# Patient Record
Sex: FEMALE | Race: OTHER | NOT HISPANIC OR LATINO | Employment: OTHER | URBAN - METROPOLITAN AREA
[De-identification: names, ages, dates, MRNs, and addresses within clinical notes are randomized per-mention and may not be internally consistent; named-entity substitution may affect disease eponyms.]

---

## 2022-05-09 ENCOUNTER — OFFICE VISIT (OUTPATIENT)
Dept: FAMILY MEDICINE | Facility: CLINIC | Age: 37
End: 2022-05-09

## 2022-05-09 VITALS
OXYGEN SATURATION: 98 % | DIASTOLIC BLOOD PRESSURE: 76 MMHG | HEIGHT: 63 IN | HEART RATE: 85 BPM | RESPIRATION RATE: 18 BRPM | SYSTOLIC BLOOD PRESSURE: 128 MMHG | WEIGHT: 266 LBS | BODY MASS INDEX: 47.13 KG/M2 | TEMPERATURE: 99 F

## 2022-05-09 DIAGNOSIS — R07.89 CHEST DISCOMFORT: Primary | ICD-10-CM

## 2022-05-09 DIAGNOSIS — R63.5 WEIGHT GAIN: ICD-10-CM

## 2022-05-09 DIAGNOSIS — K21.9 GASTROESOPHAGEAL REFLUX DISEASE, UNSPECIFIED WHETHER ESOPHAGITIS PRESENT: ICD-10-CM

## 2022-05-09 PROCEDURE — 93010 ELECTROCARDIOGRAM REPORT: CPT | Mod: S$PBB,,, | Performed by: NURSE PRACTITIONER

## 2022-05-09 PROCEDURE — 99214 OFFICE O/P EST MOD 30 MIN: CPT | Performed by: NURSE PRACTITIONER

## 2022-05-09 PROCEDURE — 93005 ELECTROCARDIOGRAM TRACING: CPT | Mod: PBBFAC | Performed by: NURSE PRACTITIONER

## 2022-05-09 PROCEDURE — 99203 PR OFFICE/OUTPT VISIT, NEW, LEVL III, 30-44 MIN: ICD-10-PCS | Mod: 25,S$PBB,, | Performed by: NURSE PRACTITIONER

## 2022-05-09 PROCEDURE — 93010 POCT EKG 12-LEAD: ICD-10-PCS | Mod: S$PBB,,, | Performed by: NURSE PRACTITIONER

## 2022-05-09 PROCEDURE — 99203 OFFICE O/P NEW LOW 30 MIN: CPT | Mod: 25,S$PBB,, | Performed by: NURSE PRACTITIONER

## 2022-05-09 RX ORDER — FAMOTIDINE 40 MG/1
40 TABLET, FILM COATED ORAL DAILY PRN
Qty: 90 TABLET | Refills: 1 | Status: SHIPPED | OUTPATIENT
Start: 2022-05-09 | End: 2023-05-09

## 2022-05-09 NOTE — PROGRESS NOTES
SUBJECTIVE:      Patient ID: Isabelle Munoz is a 36 y.o. female.    Chief Complaint: Establish Care    Pt presents as a new patient to establish care. She takes no medications on a daily basis and has no pertinent medical history. She presents today concerned because she has gained weight again and wants to ensure no comorbidities from obesity at this time. She used to exercise before COVID but now she hasn't been exercising regularly. Her diet isn't terrible reportedly. She eats three meals daily and does not snack between the meals. She is a . She is mainly drinking water during the day. She does report having a loaded tea intermittently. She drinks maybe one caffeinated product every now and again. Breakfast - typically, [employer] burrito; lunch - usually ordered, a salad of some kind, mainly grilled chicken, she dips the salad dressing; dinner - she reports she struggles the most with dinners. They are living half in her inlaws house and half in the camper while they are building a house. She reports she normally stays away from carbs. She will eat a normal dinner or a salad on the way home. She does report increased GERD recently as she gained weight. She does drink caffeine and enjoys spicy foods. Eye doctor and dentist are not UTD. Pap smear is managed by Dr. Campbell and she is not UTD. She does report one episode of intermittent chest tightness which was not recurrent and didn't radiate. Denies dizziness, palpitations, SOB, or peripheral edema. She does have a maternal hx of heart disease. Denies CP, SOB, wheezing, fevers, nausea, vomiting, diarrhea, constipation, numbness, weakness, dizziness, palpitations, or any other concerns at this time.    Chest Pain   This is a new problem. The current episode started more than 1 month ago. The problem occurs intermittently. The problem has been waxing and waning. The pain is present in the substernal region. The pain is at a severity of 0/10. The  patient is experiencing no pain. The pain does not radiate. Pertinent negatives include no abdominal pain, back pain, claudication, cough, diaphoresis, dizziness, exertional chest pressure, fever, headaches, hemoptysis, irregular heartbeat, leg pain, lower extremity edema, malaise/fatigue, nausea, near-syncope, numbness, orthopnea, palpitations, PND, shortness of breath, sputum production, syncope, vomiting or weakness. The pain is aggravated by nothing. She has tried nothing for the symptoms. Risk factors include lack of exercise, obesity and sedentary lifestyle.   Pertinent negatives for past medical history include no seizures.   Her family medical history is significant for CAD.       Past Surgical History:   Procedure Laterality Date     SECTION      x 2    Tummy Tuck  2007     Family History   Problem Relation Age of Onset    Heart disease Mother       Social History     Socioeconomic History    Marital status: Other   Tobacco Use    Smoking status: Never Smoker    Smokeless tobacco: Never Used   Substance and Sexual Activity    Alcohol use: Yes     Comment: rarely     Drug use: Never     Current Outpatient Medications   Medication Sig Dispense Refill    famotidine (PEPCID) 40 MG tablet Take 1 tablet (40 mg total) by mouth daily as needed for Heartburn. 90 tablet 1     No current facility-administered medications for this visit.     Review of patient's allergies indicates:   Allergen Reactions    Sulfa (sulfonamide antibiotics) Rash      History reviewed. No pertinent past medical history.  Past Surgical History:   Procedure Laterality Date     SECTION      x 2    Tummy Tuck  2007       Review of Systems   Constitutional: Negative for activity change, appetite change, chills, diaphoresis, fatigue, fever, malaise/fatigue and unexpected weight change.   HENT: Negative for congestion, ear pain, postnasal drip, rhinorrhea, sinus pressure, sinus pain, sneezing and sore throat.    Eyes:  "Negative for pain, discharge and visual disturbance.   Respiratory: Positive for chest tightness. Negative for cough, hemoptysis, sputum production, shortness of breath and wheezing.    Cardiovascular: Negative for chest pain, palpitations, orthopnea, claudication, leg swelling, syncope, PND and near-syncope.   Gastrointestinal: Negative for abdominal distention, abdominal pain, blood in stool, constipation, diarrhea, nausea and vomiting.   Genitourinary: Negative for difficulty urinating, flank pain, frequency, hematuria, pelvic pain, urgency and vaginal discharge.   Musculoskeletal: Negative for back pain, joint swelling and myalgias.   Skin: Negative for color change, rash and wound.   Neurological: Negative for dizziness, seizures, syncope, weakness, light-headedness, numbness and headaches.   Hematological: Negative for adenopathy.   Psychiatric/Behavioral: Negative for agitation, confusion, dysphoric mood, hallucinations, sleep disturbance and suicidal ideas. The patient is not nervous/anxious.       OBJECTIVE:      Vitals:    05/09/22 1330   BP: 128/76   BP Location: Left arm   Patient Position: Sitting   BP Method: X-Large (Manual)   Pulse: 85   Resp: 18   Temp: 98.7 °F (37.1 °C)   TempSrc: Oral   SpO2: 98%   Weight: 120.7 kg (266 lb)   Height: 5' 3" (1.6 m)     Physical Exam  Vitals reviewed.   Constitutional:       General: She is not in acute distress.     Appearance: Normal appearance. She is well-developed. She is morbidly obese. She is not diaphoretic.   HENT:      Head: Normocephalic and atraumatic.      Right Ear: Hearing, tympanic membrane, ear canal and external ear normal.      Left Ear: Hearing, tympanic membrane, ear canal and external ear normal.      Nose: Nose normal. No mucosal edema, congestion or rhinorrhea.      Mouth/Throat:      Lips: Pink.      Mouth: Mucous membranes are moist.      Pharynx: Oropharynx is clear. Uvula midline. No pharyngeal swelling, oropharyngeal exudate or posterior " oropharyngeal erythema.   Eyes:      General: Lids are normal. No scleral icterus.        Right eye: No discharge.         Left eye: No discharge.      Extraocular Movements: Extraocular movements intact.      Conjunctiva/sclera: Conjunctivae normal.      Pupils: Pupils are equal, round, and reactive to light.   Neck:      Thyroid: No thyroid mass or thyromegaly.      Vascular: No carotid bruit.      Trachea: Trachea and phonation normal. No tracheal deviation.   Cardiovascular:      Rate and Rhythm: Normal rate and regular rhythm.      Pulses: Normal pulses.      Heart sounds: Normal heart sounds. No murmur heard.    No friction rub. No gallop.   Pulmonary:      Effort: Pulmonary effort is normal. No respiratory distress.      Breath sounds: Normal breath sounds. No stridor. No decreased breath sounds, wheezing, rhonchi or rales.   Chest:   Breasts:      Right: No supraclavicular adenopathy.      Left: No supraclavicular adenopathy.       Abdominal:      General: Bowel sounds are normal. There is no distension or abdominal bruit.      Palpations: Abdomen is soft. There is no hepatomegaly, splenomegaly or mass.      Tenderness: There is no abdominal tenderness. There is no guarding or rebound. Negative signs include Garcia's sign.      Hernia: No hernia is present.   Musculoskeletal:         General: Normal range of motion.      Cervical back: Full passive range of motion without pain, normal range of motion and neck supple.      Right lower leg: No edema.      Left lower leg: No edema.   Lymphadenopathy:      Cervical: No cervical adenopathy.      Upper Body:      Right upper body: No supraclavicular adenopathy.      Left upper body: No supraclavicular adenopathy.   Skin:     General: Skin is warm and dry.      Capillary Refill: Capillary refill takes less than 2 seconds.      Findings: No rash.   Neurological:      General: No focal deficit present.      Mental Status: She is alert and oriented to person, place,  and time.      Coordination: Coordination is intact.      Gait: Gait is intact.   Psychiatric:         Attention and Perception: Attention and perception normal.         Mood and Affect: Mood and affect normal.         Speech: Speech normal.         Behavior: Behavior normal. Behavior is cooperative.         Thought Content: Thought content normal. Thought content does not include suicidal plan.         Cognition and Memory: Cognition and memory normal.         Judgment: Judgment normal.        Assessment:       1. Chest discomfort    2. Weight gain    3. Gastroesophageal reflux disease, unspecified whether esophagitis present        Plan:       Chest discomfort  Likely GERD which she does report instances of heartburn which has worsened while she gained weight. EKG today is NSR with no ST elevation, T wave changes, or arrhythmias present.  -     POCT EKG 12-LEAD (NOT FOR [facility] USE)    Weight gain  Will call with results.  -     CBC Auto Differential; Future; Expected date: 05/09/2022  -     Comprehensive Metabolic Panel; Future; Expected date: 05/09/2022  -     Lipid Panel; Future; Expected date: 05/09/2022  -     TSH; Future; Expected date: 05/09/2022  -     T4, free; Future; Expected date: 05/09/2022  -     Hemoglobin A1C; Future; Expected date: 05/09/2022    Gastroesophageal reflux disease, unspecified whether esophagitis present  Educated patient on lifestyle modifications to help control/reduce reflux/abdominal pain including: smoking cessation (if current smoker), weight loss (if overweight), small frequent meals, avoid large meals, avoid spicy, greasy, tomato-based foods. Elevate head of bed if nocturnal symptoms are present and avoid eating 2-3 hrs before bed. Limit NSAID use since they can cause GI upset, bleeding, and/or ulcers. If NSAIDs needed, take with food. Educated to avoid known foods which trigger reflux symptoms & to minimize/avoid high-fat foods (chocolate, caffeine, citrus, alcohol, & tomato  products). Start famotidine daily PRN.  -     famotidine (PEPCID) 40 MG tablet; Take 1 tablet (40 mg total) by mouth daily as needed for Heartburn.  Dispense: 90 tablet; Refill: 1        Follow up in about 1 year (around 5/9/2023) for Annual Well Check.      5/9/2022 RAYMOND Gimenez, FNP

## 2022-05-16 ENCOUNTER — LAB VISIT (OUTPATIENT)
Dept: LAB | Facility: HOSPITAL | Age: 37
End: 2022-05-16
Attending: NURSE PRACTITIONER

## 2022-05-16 DIAGNOSIS — R63.5 ABNORMAL WEIGHT GAIN: Primary | ICD-10-CM

## 2022-05-16 LAB
ALBUMIN SERPL BCP-MCNC: 4.5 G/DL (ref 3.5–5.2)
ALP SERPL-CCNC: 58 U/L (ref 55–135)
ALT SERPL W/O P-5'-P-CCNC: 50 U/L (ref 10–44)
ANION GAP SERPL CALC-SCNC: 11 MMOL/L (ref 8–16)
AST SERPL-CCNC: 23 U/L (ref 10–40)
BASOPHILS # BLD AUTO: 0.03 K/UL (ref 0–0.2)
BASOPHILS NFR BLD: 0.3 % (ref 0–1.9)
BILIRUB SERPL-MCNC: 1.1 MG/DL (ref 0.1–1)
BUN SERPL-MCNC: 10 MG/DL (ref 6–20)
CALCIUM SERPL-MCNC: 9.3 MG/DL (ref 8.7–10.5)
CHLORIDE SERPL-SCNC: 102 MMOL/L (ref 95–110)
CHOLEST SERPL-MCNC: 166 MG/DL (ref 120–199)
CHOLEST/HDLC SERPL: 3.1 {RATIO} (ref 2–5)
CO2 SERPL-SCNC: 22 MMOL/L (ref 23–29)
CREAT SERPL-MCNC: 0.5 MG/DL (ref 0.5–1.4)
DIFFERENTIAL METHOD: ABNORMAL
EOSINOPHIL # BLD AUTO: 0.1 K/UL (ref 0–0.5)
EOSINOPHIL NFR BLD: 1.5 % (ref 0–8)
ERYTHROCYTE [DISTWIDTH] IN BLOOD BY AUTOMATED COUNT: 12.4 % (ref 11.5–14.5)
EST. GFR  (AFRICAN AMERICAN): >60 ML/MIN/1.73 M^2
EST. GFR  (NON AFRICAN AMERICAN): >60 ML/MIN/1.73 M^2
ESTIMATED AVG GLUCOSE: 111 MG/DL (ref 68–131)
GLUCOSE SERPL-MCNC: 105 MG/DL (ref 70–110)
HBA1C MFR BLD: 5.5 % (ref 4.5–6.2)
HCT VFR BLD AUTO: 41.7 % (ref 37–48.5)
HDLC SERPL-MCNC: 54 MG/DL (ref 40–75)
HDLC SERPL: 32.5 % (ref 20–50)
HGB BLD-MCNC: 13.9 G/DL (ref 12–16)
IMM GRANULOCYTES # BLD AUTO: 0.03 K/UL (ref 0–0.04)
IMM GRANULOCYTES NFR BLD AUTO: 0.3 % (ref 0–0.5)
LDLC SERPL CALC-MCNC: 97.2 MG/DL (ref 63–159)
LYMPHOCYTES # BLD AUTO: 1.4 K/UL (ref 1–4.8)
LYMPHOCYTES NFR BLD: 14.7 % (ref 18–48)
MCH RBC QN AUTO: 29.6 PG (ref 27–31)
MCHC RBC AUTO-ENTMCNC: 33.3 G/DL (ref 32–36)
MCV RBC AUTO: 89 FL (ref 82–98)
MONOCYTES # BLD AUTO: 0.7 K/UL (ref 0.3–1)
MONOCYTES NFR BLD: 7.5 % (ref 4–15)
NEUTROPHILS # BLD AUTO: 7.1 K/UL (ref 1.8–7.7)
NEUTROPHILS NFR BLD: 75.7 % (ref 38–73)
NONHDLC SERPL-MCNC: 112 MG/DL
NRBC BLD-RTO: 0 /100 WBC
PLATELET # BLD AUTO: 326 K/UL (ref 150–450)
PMV BLD AUTO: 10.3 FL (ref 9.2–12.9)
POTASSIUM SERPL-SCNC: 3.6 MMOL/L (ref 3.5–5.1)
PROT SERPL-MCNC: 7.7 G/DL (ref 6–8.4)
RBC # BLD AUTO: 4.7 M/UL (ref 4–5.4)
SODIUM SERPL-SCNC: 135 MMOL/L (ref 136–145)
T4 FREE SERPL-MCNC: 0.86 NG/DL (ref 0.71–1.51)
TRIGL SERPL-MCNC: 74 MG/DL (ref 30–150)
TSH SERPL DL<=0.005 MIU/L-ACNC: 1.32 UIU/ML (ref 0.34–5.6)
WBC # BLD AUTO: 9.38 K/UL (ref 3.9–12.7)

## 2022-05-16 PROCEDURE — 85025 COMPLETE CBC W/AUTO DIFF WBC: CPT | Performed by: NURSE PRACTITIONER

## 2022-05-16 PROCEDURE — 84439 ASSAY OF FREE THYROXINE: CPT | Performed by: NURSE PRACTITIONER

## 2022-05-16 PROCEDURE — 83036 HEMOGLOBIN GLYCOSYLATED A1C: CPT | Performed by: NURSE PRACTITIONER

## 2022-05-16 PROCEDURE — 80061 LIPID PANEL: CPT | Performed by: NURSE PRACTITIONER

## 2022-05-16 PROCEDURE — 80053 COMPREHEN METABOLIC PANEL: CPT | Performed by: NURSE PRACTITIONER

## 2022-05-16 PROCEDURE — 36415 COLL VENOUS BLD VENIPUNCTURE: CPT | Performed by: NURSE PRACTITIONER

## 2022-05-16 PROCEDURE — 84443 ASSAY THYROID STIM HORMONE: CPT | Performed by: NURSE PRACTITIONER

## 2022-05-18 ENCOUNTER — PATIENT MESSAGE (OUTPATIENT)
Dept: FAMILY MEDICINE | Facility: CLINIC | Age: 37
End: 2022-05-18

## 2022-06-21 LAB
EKG 12-LEAD: NORMAL
PR INTERVAL: NORMAL
PRT AXES: NORMAL
QRS DURATION: NORMAL
QT/QTC: NORMAL
VENTRICULAR RATE: NORMAL

## 2022-10-08 ENCOUNTER — PATIENT MESSAGE (OUTPATIENT)
Dept: FAMILY MEDICINE | Facility: CLINIC | Age: 37
End: 2022-10-08

## 2022-10-10 ENCOUNTER — PATIENT MESSAGE (OUTPATIENT)
Dept: FAMILY MEDICINE | Facility: CLINIC | Age: 37
End: 2022-10-10

## 2023-06-15 ENCOUNTER — TELEPHONE (OUTPATIENT)
Dept: EMERGENCY MEDICINE | Facility: HOSPITAL | Age: 38
End: 2023-06-15

## 2023-06-15 RX ORDER — NEOMYCIN SULFATE, POLYMYXIN B SULFATE AND DEXAMETHASONE 3.5; 10000; 1 MG/ML; [USP'U]/ML; MG/ML
1 SUSPENSION/ DROPS OPHTHALMIC
Qty: 5 ML | Refills: 0 | Status: SHIPPED | OUTPATIENT
Start: 2023-06-15 | End: 2023-06-22

## 2024-01-19 RX ORDER — ONDANSETRON 4 MG/1
4 TABLET, ORALLY DISINTEGRATING ORAL EVERY 8 HOURS PRN
Qty: 15 TABLET | Refills: 0 | Status: SHIPPED | OUTPATIENT
Start: 2024-01-19 | End: 2024-01-24

## 2024-11-04 LAB
HUMAN PAPILLOMAVIRUS (HPV): NORMAL
PAP RECOMMENDATION EXT: NORMAL
PAP SMEAR: NORMAL

## 2025-01-07 ENCOUNTER — TELEPHONE (OUTPATIENT)
Dept: FAMILY MEDICINE | Facility: CLINIC | Age: 40
End: 2025-01-07
Payer: MEDICAID

## 2025-01-07 NOTE — TELEPHONE ENCOUNTER
----- Message from Tanner sent at 1/7/2025  3:32 PM CST -----  Contact: Sheela  Type:  Sooner Appointment Request    Caller is requesting a sooner appointment.  Caller declined first available appointment listed below.  Caller will not accept being placed on the waitlist and is requesting a message be sent to doctor.    Name of Caller:  Sheela - Mother    When is the first available appointment?  NA  Symptoms:  NA    Would the patient rather a call back or a response via [facility]? Call Back  Best Call Back Number:  [phone]    Additional Information:  Sheela is requesting a call back, she is wanting to schedule an appt for the patient, she also has some questions regarding the patients insurance ( she doesn't have the info with her)

## 2025-01-08 ENCOUNTER — OFFICE VISIT (OUTPATIENT)
Dept: FAMILY MEDICINE | Facility: CLINIC | Age: 40
End: 2025-01-08
Payer: MEDICAID

## 2025-01-08 ENCOUNTER — TELEPHONE (OUTPATIENT)
Dept: FAMILY MEDICINE | Facility: CLINIC | Age: 40
End: 2025-01-08
Payer: MEDICAID

## 2025-01-08 ENCOUNTER — PATIENT OUTREACH (OUTPATIENT)
Dept: ADMINISTRATIVE | Facility: HOSPITAL | Age: 40
End: 2025-01-08
Payer: MEDICAID

## 2025-01-08 ENCOUNTER — LAB VISIT (OUTPATIENT)
Dept: LAB | Facility: HOSPITAL | Age: 40
End: 2025-01-08
Attending: STUDENT IN AN ORGANIZED HEALTH CARE EDUCATION/TRAINING PROGRAM
Payer: MEDICAID

## 2025-01-08 VITALS
HEIGHT: 63 IN | WEIGHT: 293 LBS | BODY MASS INDEX: 51.91 KG/M2 | OXYGEN SATURATION: 99 % | DIASTOLIC BLOOD PRESSURE: 80 MMHG | SYSTOLIC BLOOD PRESSURE: 136 MMHG | HEART RATE: 101 BPM

## 2025-01-08 DIAGNOSIS — Z00.00 ROUTINE GENERAL MEDICAL EXAMINATION AT A HEALTH CARE FACILITY: Primary | ICD-10-CM

## 2025-01-08 DIAGNOSIS — J32.9 SINUSITIS, UNSPECIFIED CHRONICITY, UNSPECIFIED LOCATION: ICD-10-CM

## 2025-01-08 DIAGNOSIS — K21.9 GASTROESOPHAGEAL REFLUX DISEASE, UNSPECIFIED WHETHER ESOPHAGITIS PRESENT: ICD-10-CM

## 2025-01-08 DIAGNOSIS — Z83.49 FAMILY HISTORY OF HEMOCHROMATOSIS: ICD-10-CM

## 2025-01-08 DIAGNOSIS — Z00.00 ROUTINE GENERAL MEDICAL EXAMINATION AT A HEALTH CARE FACILITY: ICD-10-CM

## 2025-01-08 DIAGNOSIS — Z71.3 WEIGHT LOSS COUNSELING, ENCOUNTER FOR: ICD-10-CM

## 2025-01-08 LAB
ALBUMIN SERPL BCP-MCNC: 4.1 G/DL (ref 3.5–5.2)
ALP SERPL-CCNC: 70 U/L (ref 40–150)
ALT SERPL W/O P-5'-P-CCNC: 29 U/L (ref 10–44)
ANION GAP SERPL CALC-SCNC: 11 MMOL/L (ref 8–16)
AST SERPL-CCNC: 20 U/L (ref 10–40)
BASOPHILS # BLD AUTO: 0.03 K/UL (ref 0–0.2)
BASOPHILS NFR BLD: 0.5 % (ref 0–1.9)
BILIRUB SERPL-MCNC: 1 MG/DL (ref 0.1–1)
BUN SERPL-MCNC: 12 MG/DL (ref 6–20)
CALCIUM SERPL-MCNC: 9.5 MG/DL (ref 8.7–10.5)
CHLORIDE SERPL-SCNC: 105 MMOL/L (ref 95–110)
CHOLEST SERPL-MCNC: 144 MG/DL (ref 120–199)
CHOLEST/HDLC SERPL: 3.2 {RATIO} (ref 2–5)
CO2 SERPL-SCNC: 23 MMOL/L (ref 23–29)
CREAT SERPL-MCNC: 0.6 MG/DL (ref 0.5–1.4)
DIFFERENTIAL METHOD BLD: ABNORMAL
EOSINOPHIL # BLD AUTO: 0.1 K/UL (ref 0–0.5)
EOSINOPHIL NFR BLD: 2.3 % (ref 0–8)
ERYTHROCYTE [DISTWIDTH] IN BLOOD BY AUTOMATED COUNT: 13.2 % (ref 11.5–14.5)
EST. GFR  (NO RACE VARIABLE): >60 ML/MIN/1.73 M^2
ESTIMATED AVG GLUCOSE: 123 MG/DL (ref 68–131)
GLUCOSE SERPL-MCNC: 104 MG/DL (ref 70–110)
HBA1C MFR BLD: 5.9 % (ref 4–5.6)
HCT VFR BLD AUTO: 40.8 % (ref 37–48.5)
HDLC SERPL-MCNC: 45 MG/DL (ref 40–75)
HDLC SERPL: 31.3 % (ref 20–50)
HGB BLD-MCNC: 12.6 G/DL (ref 12–16)
IMM GRANULOCYTES # BLD AUTO: 0.01 K/UL (ref 0–0.04)
IMM GRANULOCYTES NFR BLD AUTO: 0.2 % (ref 0–0.5)
LDLC SERPL CALC-MCNC: 88.4 MG/DL (ref 63–159)
LYMPHOCYTES # BLD AUTO: 1.2 K/UL (ref 1–4.8)
LYMPHOCYTES NFR BLD: 19.5 % (ref 18–48)
MCH RBC QN AUTO: 27.9 PG (ref 27–31)
MCHC RBC AUTO-ENTMCNC: 30.9 G/DL (ref 32–36)
MCV RBC AUTO: 91 FL (ref 82–98)
MONOCYTES # BLD AUTO: 0.6 K/UL (ref 0.3–1)
MONOCYTES NFR BLD: 10.7 % (ref 4–15)
NEUTROPHILS # BLD AUTO: 4 K/UL (ref 1.8–7.7)
NEUTROPHILS NFR BLD: 66.8 % (ref 38–73)
NONHDLC SERPL-MCNC: 99 MG/DL
NRBC BLD-RTO: 0 /100 WBC
PLATELET # BLD AUTO: 322 K/UL (ref 150–450)
PMV BLD AUTO: 10.6 FL (ref 9.2–12.9)
POTASSIUM SERPL-SCNC: 3.9 MMOL/L (ref 3.5–5.1)
PROT SERPL-MCNC: 7.8 G/DL (ref 6–8.4)
RBC # BLD AUTO: 4.51 M/UL (ref 4–5.4)
SODIUM SERPL-SCNC: 139 MMOL/L (ref 136–145)
T4 FREE SERPL-MCNC: 0.9 NG/DL (ref 0.71–1.51)
TRIGL SERPL-MCNC: 53 MG/DL (ref 30–150)
TSH SERPL DL<=0.005 MIU/L-ACNC: 1.37 UIU/ML (ref 0.4–4)
WBC # BLD AUTO: 5.96 K/UL (ref 3.9–12.7)

## 2025-01-08 PROCEDURE — 85025 COMPLETE CBC W/AUTO DIFF WBC: CPT | Performed by: STUDENT IN AN ORGANIZED HEALTH CARE EDUCATION/TRAINING PROGRAM

## 2025-01-08 PROCEDURE — 84443 ASSAY THYROID STIM HORMONE: CPT | Performed by: STUDENT IN AN ORGANIZED HEALTH CARE EDUCATION/TRAINING PROGRAM

## 2025-01-08 PROCEDURE — 3075F SYST BP GE 130 - 139MM HG: CPT | Mod: CPTII,,, | Performed by: STUDENT IN AN ORGANIZED HEALTH CARE EDUCATION/TRAINING PROGRAM

## 2025-01-08 PROCEDURE — 1159F MED LIST DOCD IN RCRD: CPT | Mod: CPTII,,, | Performed by: STUDENT IN AN ORGANIZED HEALTH CARE EDUCATION/TRAINING PROGRAM

## 2025-01-08 PROCEDURE — 80053 COMPREHEN METABOLIC PANEL: CPT | Performed by: STUDENT IN AN ORGANIZED HEALTH CARE EDUCATION/TRAINING PROGRAM

## 2025-01-08 PROCEDURE — 84439 ASSAY OF FREE THYROXINE: CPT | Performed by: STUDENT IN AN ORGANIZED HEALTH CARE EDUCATION/TRAINING PROGRAM

## 2025-01-08 PROCEDURE — 81256 HFE GENE: CPT | Performed by: STUDENT IN AN ORGANIZED HEALTH CARE EDUCATION/TRAINING PROGRAM

## 2025-01-08 PROCEDURE — 1160F RVW MEDS BY RX/DR IN RCRD: CPT | Mod: CPTII,,, | Performed by: STUDENT IN AN ORGANIZED HEALTH CARE EDUCATION/TRAINING PROGRAM

## 2025-01-08 PROCEDURE — 83036 HEMOGLOBIN GLYCOSYLATED A1C: CPT | Performed by: STUDENT IN AN ORGANIZED HEALTH CARE EDUCATION/TRAINING PROGRAM

## 2025-01-08 PROCEDURE — 3079F DIAST BP 80-89 MM HG: CPT | Mod: CPTII,,, | Performed by: STUDENT IN AN ORGANIZED HEALTH CARE EDUCATION/TRAINING PROGRAM

## 2025-01-08 PROCEDURE — 3044F HG A1C LEVEL LT 7.0%: CPT | Mod: CPTII,,, | Performed by: STUDENT IN AN ORGANIZED HEALTH CARE EDUCATION/TRAINING PROGRAM

## 2025-01-08 PROCEDURE — 80061 LIPID PANEL: CPT | Performed by: STUDENT IN AN ORGANIZED HEALTH CARE EDUCATION/TRAINING PROGRAM

## 2025-01-08 PROCEDURE — 99395 PREV VISIT EST AGE 18-39: CPT | Mod: S$PBB,,, | Performed by: STUDENT IN AN ORGANIZED HEALTH CARE EDUCATION/TRAINING PROGRAM

## 2025-01-08 PROCEDURE — 3008F BODY MASS INDEX DOCD: CPT | Mod: CPTII,,, | Performed by: STUDENT IN AN ORGANIZED HEALTH CARE EDUCATION/TRAINING PROGRAM

## 2025-01-08 PROCEDURE — 99214 OFFICE O/P EST MOD 30 MIN: CPT | Mod: PBBFAC,PO | Performed by: STUDENT IN AN ORGANIZED HEALTH CARE EDUCATION/TRAINING PROGRAM

## 2025-01-08 PROCEDURE — 99999 PR PBB SHADOW E&M-EST. PATIENT-LVL IV: CPT | Mod: PBBFAC,,, | Performed by: STUDENT IN AN ORGANIZED HEALTH CARE EDUCATION/TRAINING PROGRAM

## 2025-01-08 RX ORDER — FLUTICASONE PROPIONATE 50 MCG
1 SPRAY, SUSPENSION (ML) NASAL DAILY
Qty: 11.1 ML | Refills: 0 | Status: SHIPPED | OUTPATIENT
Start: 2025-01-08

## 2025-01-08 RX ORDER — BENZONATATE 100 MG/1
100 CAPSULE ORAL 3 TIMES DAILY PRN
Qty: 30 CAPSULE | Refills: 0 | Status: SHIPPED | OUTPATIENT
Start: 2025-01-08 | End: 2025-01-18

## 2025-01-08 RX ORDER — AMOXICILLIN AND CLAVULANATE POTASSIUM 875; 125 MG/1; MG/1
1 TABLET, FILM COATED ORAL EVERY 12 HOURS
Qty: 10 TABLET | Refills: 0 | Status: SHIPPED | OUTPATIENT
Start: 2025-01-08 | End: 2025-01-13

## 2025-01-08 NOTE — PROGRESS NOTES
"Population Health Chart Review & Patient Outreach Details      Additional Pop Health Notes:      Chart Routed "CC'd" from Maria Lepe MD to retrieve and upload external Report         Updates Requested / Reviewed:      Updated Care Coordination Note, Care Everywhere, , External Sources: [facility] and [facility], and Care Team Updated         Health Maintenance Topics Overdue:      [facility] Score: 1     Cervical Cancer Screening                       Health Maintenance Topic(s) Outreach Outcomes & Actions Taken:    Cervical Cancer Screening - Outreach Outcomes & Actions Taken  : External Records Uploaded & Care Team Updated if Applicable      "

## 2025-01-08 NOTE — PATIENT INSTRUCTIONS
Eduardo Walton,     If you are due for any health screening(s) below please notify me so we can arrange them to be ordered and scheduled. Most healthy patients at your age complete them, but you are free to accept or refuse.     If you can't do it, I'll definitely understand. If you can, I'd certainly appreciate it!    Tests to Keep You Healthy    Cervical Cancer Screening: DUE      Your cervical cancer screening is due     Our records indicate that you may be overdue for your screening Pap smear. A Pap smear is an important health screening that can detect abnormal cells that can become cervical cancer. Cervical cancer screenings allow for early diagnosis and increase the likelihood of successful treatment.     The current recommendation for Pap smear screening is every 3-5 years for women at average risk. We encourage you to schedule your appointment with your womens health provider. Many women see a gynecologist for this screening, but some primary care providers also provide Pap screening.     If you recently had your Pap smear screening performed outside of [facility], please let your health care team know so that they can update your health record.

## 2025-01-08 NOTE — TELEPHONE ENCOUNTER
----- Message from Emmie sent at 1/7/2025  5:20 PM CST -----  Type:  Needs Medical Advice    Who Called: aki godinez  Would the patient rather a call back or a response via [facility]? call  Best Call Back Number:    Additional Information: needing to cancel and reschedule 1.8 appt for a Mon or Wed

## 2025-01-08 NOTE — PROGRESS NOTES
[facility]  Office Visit Note     SUBJECTIVE:   HPI: Isabelle Munoz  is a 39 y.o. female     History of Present Illness    CHIEF COMPLAINT:  Patient presents today to [facility] care  She is accompanied by her mother     HISTORY OF PRESENT ILLNESS:  She reports experiencing pressure in her head for the past week. She has a cough attributed to nasal drip and fluid in the right ear, suggesting a possible ear infection. She denies fever, chills, nausea, or vomiting.    WEIGHT MANAGEMENT:  She reports gaining approximately 100 lbs over the last 3-4 years from a baseline of 180 lbs. She is currently at 293 lbs. She attributes the weight gain to decreased physical activity after COVID-19 and prioritizing business ventures including building a salon and house over personal health.    MEDICAL CONDITIONS:  She has a history of acid reflux previously treated with famotidine, though not taken regularly.    FAMILY HISTORY:  Family history includes diabetes, hypertension, and heart problems. Mother has Sjogren's syndrome and had porphyria cutanea tarda (PCT). Mother also added that her children were encouraged to be tested for hemochromatosis     SURGICAL HISTORY:  History of two C-sections and a tummy tuck in 2007.    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         Patient Outreach on 01/08/2025   Component Date Value Ref Range Status    HPV DNA 11/04/2024 None Detected  None Detected Final    PAP Recommendation External 11/04/2024 No follow-up frequency specified   Final    Pap 11/04/2024 Negative for intraephithelial lesion or malignancy  Negative for intraephithelial lesion or malignancy, Other Final   Lab Visit on 01/08/2025   Component Date Value Ref Range Status    WBC 01/08/2025 5.96  3.90 - 12.70 K/uL Final    RBC 01/08/2025 4.51  4.00 - 5.40 M/uL Final    Hemoglobin 01/08/2025 12.6  12.0 - 16.0 g/dL Final    Hematocrit 01/08/2025 40.8  37.0 -  48.5 % Final    MCV 2025 91  82 - 98 fL Final    MCH 2025 27.9  27.0 - 31.0 pg Final    MCHC 2025 30.9 (L)  32.0 - 36.0 g/dL Final    RDW 2025 13.2  11.5 - 14.5 % Final    Platelets 2025 322  150 - 450 K/uL Final    MPV 2025 10.6  9.2 - 12.9 fL Final    Immature Granulocytes 2025 0.2  0.0 - 0.5 % Final    Gran # (ANC) 2025 4.0  1.8 - 7.7 K/uL Final    Immature Grans (Abs) 2025 0.01  0.00 - 0.04 K/uL Final    Lymph # 2025 1.2  1.0 - 4.8 K/uL Final    Mono # 2025 0.6  0.3 - 1.0 K/uL Final    Eos # 2025 0.1  0.0 - 0.5 K/uL Final    Baso # 2025 0.03  0.00 - 0.20 K/uL Final    nRBC 2025 0  0 /100 WBC Final    Gran % 2025 66.8  38.0 - 73.0 % Final    Lymph % 2025 19.5  18.0 - 48.0 % Final    Mono % 2025 10.7  4.0 - 15.0 % Final    Eosinophil % 2025 2.3  0.0 - 8.0 % Final    Basophil % 2025 0.5  0.0 - 1.9 % Final    Differential Method 2025 Automated   Final    Hemoglobin A1C 2025 5.9 (H)  4.0 - 5.6 % Final    Estimated Avg Glucose 2025 123  68 - 131 mg/dL Final         Current Outpatient Medications on File Prior to Visit   Medication Sig Dispense Refill    [DISCONTINUED] famotidine (PEPCID) 40 MG tablet Take 1 tablet (40 mg total) by mouth daily as needed for Heartburn. (Patient not taking: Reported on 2025) 90 tablet 1     No current facility-administered medications on file prior to visit.     History reviewed. No pertinent past medical history.  Past Surgical History:   Procedure Laterality Date     SECTION      x 2    Tummy 2007     Past Surgical History:   Procedure Laterality Date     SECTION      x 2    Tummy 2007     Family History   Problem Relation Name Age of Onset    Heart disease Mother         Marital Status: Other  Alcohol History:  reports current alcohol use.  Tobacco History:  reports that she has never smoked. She has never used smokeless  "tobacco.  Drug History:  reports no history of drug use.    Health Maintenance Topics with due status: Not Due       Topic Last Completion Date    Cervical Cancer Screening 11/04/2024    Hemoglobin A1c (Diabetic Prevention Screening) 01/08/2025    RSV Vaccine (Age 60+ and Pregnant patients) Not Due       There is no immunization history on file for this patient.    Review of patient's allergies indicates:   Allergen Reactions    Sulfa (sulfonamide antibiotics) Rash       Current Outpatient Medications:     amoxicillin-clavulanate 875-125mg (AUGMENTIN) 875-125 mg per tablet, Take 1 tablet by mouth every 12 (twelve) hours. for 5 days, Disp: 10 tablet, Rfl: 0    benzonatate (TESSALON) 100 MG capsule, Take 1 capsule (100 mg total) by mouth 3 (three) times daily as needed for Cough., Disp: 30 capsule, Rfl: 0    fluticasone propionate (FLONASE) 50 mcg/actuation nasal spray, 1 spray (50 mcg total) by Each Nostril route once daily., Disp: 11.1 mL, Rfl: 0    Review of Systems   Constitutional:  Negative for chills and fever.   HENT:  Positive for rhinorrhea and sinus pressure/congestion.    Respiratory:  Positive for cough. Negative for shortness of breath.    Gastrointestinal:  Positive for reflux. Negative for nausea and vomiting.       OBJECTIVE:      Vitals:    01/08/25 0812   BP: 136/80   BP Location: Right arm   Patient Position: Sitting   Pulse: 101   SpO2: 99%   Weight: 133.1 kg (293 lb 6.9 oz)   Height: 5' 3" (1.6 m)     Physical Exam  Constitutional:       General: She is not in acute distress.     Appearance: She is obese. She is not ill-appearing or toxic-appearing.   HENT:      Head: Normocephalic and atraumatic.      Right Ear: Tympanic membrane is erythematous.      Left Ear: Tympanic membrane, ear canal and external ear normal.      Mouth/Throat:      Mouth: Mucous membranes are moist.      Pharynx: Uvula midline. No pharyngeal swelling or posterior oropharyngeal erythema.   Cardiovascular:      Rate and " Rhythm: Normal rate and regular rhythm.   Pulmonary:      Effort: Pulmonary effort is normal. No tachypnea, bradypnea, accessory muscle usage, prolonged expiration or respiratory distress.      Breath sounds: Normal breath sounds. No stridor. No wheezing, rhonchi or rales.   Lymphadenopathy:      Cervical: No cervical adenopathy.   Neurological:      General: No focal deficit present.      Mental Status: She is alert.   Psychiatric:         Mood and Affect: Mood normal.         Behavior: Behavior normal.        Assessment:       1. Routine general medical examination at a health care facility    2. Sinusitis, unspecified chronicity, unspecified location    3. Gastroesophageal reflux disease, unspecified whether esophagitis present    4. Family history of hemochromatosis    5. BMI 50.0-59.9, adult    6. Weight loss counseling, encounter for        Plan:       Routine general medical examination at a health care facility  -     CBC Auto Differential; Future; Expected date: 01/08/2025  -     Comprehensive Metabolic Panel; Future; Expected date: 01/08/2025  -     Hemoglobin A1C; Future; Expected date: 01/08/2025  -     Lipid Panel; Future; Expected date: 01/08/2025  -     TSH; Future; Expected date: 01/08/2025  -     T4, Free; Future; Expected date: 01/08/2025    Sinusitis, unspecified chronicity, unspecified location  -     amoxicillin-clavulanate 875-125mg (AUGMENTIN) 875-125 mg per tablet; Take 1 tablet by mouth every 12 (twelve) hours. for 5 days  Dispense: 10 tablet; Refill: 0  -     fluticasone propionate (FLONASE) 50 mcg/actuation nasal spray; 1 spray (50 mcg total) by Each Nostril route once daily.  Dispense: 11.1 mL; Refill: 0  -     benzonatate (TESSALON) 100 MG capsule; Take 1 capsule (100 mg total) by mouth 3 (three) times daily as needed for Cough.  Dispense: 30 capsule; Refill: 0  - Noted patient's reports of sinus pressure, chest congestion for about a week.  - Examined ears and throat, observing fluid in  the left ear, erythema in the right ear (TM), and slightly enlarged tonsils.  - Determined antibiotics are not currently required, but provided a prescription as a precaution.  - Prescribed Zyrtec or Claritin: Take 1 tablet daily for symptom relief.  - Initiated Flonase nasal spray: Use daily until sinus symptoms resolve.  - Explained sinus pressure symptoms are likely due to fluid buildup in ears.    Gastroesophageal reflux disease, unspecified whether esophagitis present  - Advised on potential worsening of symptoms and future treatment options.  - Recommend famotidine or OTC acid reflux medication on an as-needed basis.    Family history of hemochromatosis  -     HEMOCHROMATOSIS DNA ANALYSIS (PCR); Future; Expected date: 01/08/2025    BMI 50.0-59.9, adult  Weight loss counseling, encounter for  - Will be addressed at the next visit     WEIGHT MANAGEMENT:  - Noted patient's report of significant weight gain over the past 3-4 years.  - Inquired about patient's weight loss efforts and eating habits.  - Considered weight loss options; awaiting lab results before determining appropriate plan.        Counseled on age and gender appropriate medical preventative services, including cancer screenings, immunizations, overall nutritional health, need for a consistent exercise regimen and an overall push towards maintaining a vigorous and active lifestyle.      Follow up in a few weeks for weight loss discussion     Maria Lepe M.D.  1/8/2025    This note was created using [employer] voice recognition software that occasionally misinterprets phrases or words

## 2025-01-11 LAB
GENETICIST REVIEW: NORMAL
HFE GENE MUT ANL BLD/T: NORMAL
HFE RELEASED BY: NORMAL
HFE RESULT SUMMARY: NORMAL
REF LAB TEST METHOD: NORMAL
SPECIMEN SOURCE: NORMAL
SPECIMEN,  HEMOCHROMATOSIS: NORMAL

## 2025-01-12 DIAGNOSIS — Z83.49 FAMILY HISTORY OF HEMOCHROMATOSIS: Primary | ICD-10-CM

## 2025-03-17 ENCOUNTER — OFFICE VISIT (OUTPATIENT)
Dept: FAMILY MEDICINE | Facility: CLINIC | Age: 40
End: 2025-03-17

## 2025-03-17 VITALS
OXYGEN SATURATION: 99 % | BODY MASS INDEX: 48.75 KG/M2 | HEIGHT: 63 IN | SYSTOLIC BLOOD PRESSURE: 122 MMHG | HEART RATE: 69 BPM | WEIGHT: 275.13 LBS | DIASTOLIC BLOOD PRESSURE: 82 MMHG

## 2025-03-17 DIAGNOSIS — R73.03 PREDIABETES: Primary | ICD-10-CM

## 2025-03-17 DIAGNOSIS — Z71.3 WEIGHT LOSS COUNSELING, ENCOUNTER FOR: ICD-10-CM

## 2025-03-17 DIAGNOSIS — K21.9 GASTROESOPHAGEAL REFLUX DISEASE, UNSPECIFIED WHETHER ESOPHAGITIS PRESENT: ICD-10-CM

## 2025-03-17 PROCEDURE — 99999 PR PBB SHADOW E&M-EST. PATIENT-LVL III: CPT | Mod: PBBFAC,,, | Performed by: STUDENT IN AN ORGANIZED HEALTH CARE EDUCATION/TRAINING PROGRAM

## 2025-03-17 NOTE — PROGRESS NOTES
"[facility]   OFFICE VISIT NOTE    Patient Name: Isabelle Munoz  YOB: 1985    PRESENTING HISTORY     History of Present Illness:  Ms. Isabelle Munoz is a 39 y.o. female     Prediabetes 5.9 (1/8/2025)     History of Present Illness    CHIEF COMPLAINT:  Patient presents today for follow-up on lab results.    LABS:  A1C results indicate progression to prediabetes range (5.7-6.4%), increased from 5.5% two years ago to 5.9%    WEIGHT MANAGEMENT:  She reports a 20-pound weight loss over the past two months through dietary changes, including increased whole foods consumption and reduced processed food intake. She has implemented intermittent fasting with a 16-hour fasting window daily, eating only between noon and 8 PM. She expresses strong preference to avoid weight loss medications, citing negative past experience with Adipex.    DIET AND EXERCISE:  She primarily drinks water with occasional diet coke consumption (1-2 times weekly). She acknowledges need for increased physical activity. She owns a [employer] bike but reports inconsistent use. She anticipates increased activity opportunities during her daughter's softball practices.     Review of Systems   Constitutional:  Negative for chills and fever.   Respiratory:  Negative for shortness of breath.    Cardiovascular:  Negative for chest pain.   Gastrointestinal:  Negative for abdominal pain, constipation, diarrhea, nausea and vomiting.          OBJECTIVE:   Vital Signs:  Vitals:    03/17/25 0933   BP: 122/82   Pulse: 69   SpO2: 99%   Weight: 124.8 kg (275 lb 2.2 oz)   Height: 5' 3" (1.6 m)       Physical Exam  Constitutional:       General: She is not in acute distress.     Appearance: She is obese. She is not ill-appearing or toxic-appearing.   HENT:      Head: Normocephalic and atraumatic.      Mouth/Throat:      Mouth: Mucous membranes are moist.      Pharynx: Uvula midline. No pharyngeal swelling.   Eyes:      Extraocular " Movements: Extraocular movements intact.      Pupils: Pupils are equal, round, and reactive to light.   Cardiovascular:      Rate and Rhythm: Normal rate and regular rhythm.   Pulmonary:      Effort: Pulmonary effort is normal. No tachypnea, bradypnea, accessory muscle usage, prolonged expiration or respiratory distress.      Breath sounds: Normal breath sounds. No stridor. No wheezing, rhonchi or rales.   Abdominal:      General: Bowel sounds are normal. There is no distension.      Palpations: Abdomen is soft.      Tenderness: There is no abdominal tenderness. There is no guarding or rebound.   Neurological:      General: No focal deficit present.      Mental Status: She is alert.   Psychiatric:         Mood and Affect: Mood normal.         Behavior: Behavior normal.         ASSESSMENT & PLAN:     Prediabetes  - Monitored the patient's A1C level, which is now in the prediabetes range (5.7 to 6.4), higher than the previous reading of 5.5.  - Evaluated that the patient's glucose control is not optimal, but not severe enough to be classified as diabetic.  - Explained the prediabetes range and its implications for future diabetes risk to the patient.  - Recommend continuing with current lifestyle modifications, including intermittent fasting, consuming whole foods, and reducing processed food intake.  - Suggested incorporating more physical activity into the patient's routine.  - Ordered A1C test in 6 months for reassessment.  - Explained importance of eating sequence: vegetables first, then protein, and carbohydrates last to prevent glucose spike     BMI 45.0-49.9, adult  Weight loss counseling, encounter for  - Monitored the patient's weight loss progress, noting a significant reduction of 20 lbs in 2 months from an initial weight of 293 lbs.  - Evaluated the current weight loss pace (5-10 lbs per month) as appropriate and healthy.  - Assessed the effectiveness of the patient's current weight loss methods, including  intermittent fasting and whole food diet.  - Decided against prescribing weight loss medications due to potential for rebound weight gain and patient preference.  - Discussed the importance of sustainable lifestyle changes over rapid, unsustainable weight loss methods.  - Suggested incorporating more physical activity into the patient's routine.    Gastroesophageal reflux disease, unspecified whether esophagitis present  - Stable  - Continue to monitor, currently off med     FOLLOW-UP:  - Scheduled a follow-up visit in 6 months to reassess A1C and other lab results.  - Follow up in 6 months for lab work review and weight check.  - Contact the office if any questions arise or if an earlier appointment is needed.         Maria Lepe MD  Family Medicine  [facility]     This note was created using kinkon voice recognition software that occasionally misinterprets phrases or words

## 2025-05-18 ENCOUNTER — HOSPITAL ENCOUNTER (EMERGENCY)
Facility: HOSPITAL | Age: 40
Discharge: HOME | End: 2025-05-19
Attending: EMERGENCY MEDICINE
Payer: COMMERCIAL

## 2025-05-18 VITALS
SYSTOLIC BLOOD PRESSURE: 158 MMHG | OXYGEN SATURATION: 96 % | RESPIRATION RATE: 18 BRPM | DIASTOLIC BLOOD PRESSURE: 114 MMHG | BODY MASS INDEX: 47.84 KG/M2 | HEART RATE: 86 BPM | WEIGHT: 270 LBS | HEIGHT: 63 IN | TEMPERATURE: 99 F

## 2025-05-18 DIAGNOSIS — S51.811A LACERATION OF RIGHT FOREARM, INITIAL ENCOUNTER: Primary | ICD-10-CM
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
## 2025-05-18 PROCEDURE — 99283 EMERGENCY DEPT VISIT LOW MDM: CPT | Mod: 25

## 2025-05-19 PROCEDURE — 63600175 PHARM REV CODE 636 W HCPCS: Performed by: EMERGENCY MEDICINE

## 2025-05-19 PROCEDURE — 63600175 PHARM REV CODE 636 W HCPCS: Performed by: NURSE PRACTITIONER

## 2025-05-19 PROCEDURE — 90715 TDAP VACCINE 7 YRS/> IM: CPT | Performed by: NURSE PRACTITIONER

## 2025-05-19 PROCEDURE — 96372 THER/PROPH/DIAG INJ SC/IM: CPT | Performed by: NURSE PRACTITIONER

## 2025-05-19 PROCEDURE — 25000003 PHARM REV CODE 250: Performed by: EMERGENCY MEDICINE

## 2025-05-19 PROCEDURE — 90471 IMMUNIZATION ADMIN: CPT | Performed by: NURSE PRACTITIONER

## 2025-05-19 RX ORDER — LIDOCAINE HYDROCHLORIDE 10 MG/ML
5 INJECTION, SOLUTION EPIDURAL; INFILTRATION; INTRACAUDAL; PERINEURAL
Status: COMPLETED | OUTPATIENT
Start: 2025-05-19 | End: 2025-05-19

## 2025-05-19 RX ORDER — AMOXICILLIN AND CLAVULANATE POTASSIUM 875; 125 MG/1; MG/1
1 TABLET, FILM COATED ORAL 2 TIMES DAILY
Qty: 14 TABLET | Refills: 0 | Status: SHIPPED | OUTPATIENT
Start: 2025-05-19

## 2025-05-19 RX ORDER — AMOXICILLIN AND CLAVULANATE POTASSIUM 875; 125 MG/1; MG/1
1 TABLET, FILM COATED ORAL
Status: COMPLETED | OUTPATIENT
Start: 2025-05-19 | End: 2025-05-19

## 2025-05-19 RX ADMIN — BACITRACIN ZINC, NEOMYCIN, POLYMYXIN B: 400; 3.5; 5 OINTMENT TOPICAL at 01:05

## 2025-05-19 RX ADMIN — AMOXICILLIN AND CLAVULANATE POTASSIUM 1 TABLET: 875; 125 TABLET, FILM COATED ORAL at 01:05

## 2025-05-19 RX ADMIN — CLOSTRIDIUM TETANI TOXOID ANTIGEN (FORMALDEHYDE INACTIVATED), CORYNEBACTERIUM DIPHTHERIAE TOXOID ANTIGEN (FORMALDEHYDE INACTIVATED), BORDETELLA PERTUSSIS TOXOID ANTIGEN (GLUTARALDEHYDE INACTIVATED), BORDETELLA PERTUSSIS FILAMENTOUS HEMAGGLUTININ ANTIGEN (FORMALDEHYDE INACTIVATED), BORDETELLA PERTUSSIS PERTACTIN ANTIGEN, AND BORDETELLA PERTUSSIS FIMBRIAE 2/3 ANTIGEN 0.5 ML: 5; 2; 2.5; 5; 3; 5 INJECTION, SUSPENSION INTRAMUSCULAR at 12:05

## 2025-05-19 RX ADMIN — LIDOCAINE HYDROCHLORIDE 50 MG: 10 INJECTION, SOLUTION EPIDURAL; INFILTRATION; INTRACAUDAL at 01:05

## 2025-05-19 RX ADMIN — IBUPROFEN 600 MG: 400 TABLET ORAL at 01:05

## 2025-05-19 NOTE — ED PROVIDER NOTES
Encounter Date: 2025       History     Chief Complaint   Patient presents with    Animal Bite     Patient states that she was bitten on her right wrist by her dog. She states that her dog is up to date on all vaccinations     Emergent eval of a 39-year-old female who presents to the ER due to 2 puncture wounds last lacerations to the right wrist and forearm sustained when she was trying to break up a dog fight between her 2 dogs.  She reports that 1 accidentally bit her.  Both dogs are up-to-date on vaccines.  She reports unknown last tetanus for herself  She reports no pain.  No bleeding at the time she reports she would feel numb around 1 wound but that has now normal.  Full range motion of the fingers and wrist without pain no numbness tingling or weakness.  No active bleeding      Review of patient's allergies indicates:   Allergen Reactions    Sulfa (sulfonamide antibiotics) Rash     No past medical history on file.  Past Surgical History:   Procedure Laterality Date     SECTION      x 2    Tummy Tuck  2007     Family History   Problem Relation Name Age of Onset    Heart disease Mother       Social History[1]  Review of Systems   Constitutional:  Negative for activity change.   Musculoskeletal:  Positive for myalgias. Negative for arthralgias.   Skin:  Positive for color change and wound. Negative for pallor and rash.   Allergic/Immunologic: Negative for immunocompromised state.   Neurological:  Positive for numbness. Negative for weakness.   All other systems reviewed and are negative.      Physical Exam     Initial Vitals [25 2333]   BP Pulse Resp Temp SpO2   (!) 158/114 86 18 99.3 °F (37.4 °C) 96 %      MAP       --         Physical Exam    Nursing note and vitals reviewed.  Constitutional: She appears well-developed and well-nourished. She is not diaphoretic. No distress.   HENT:   Head: Atraumatic.   Cardiovascular:  Intact distal pulses.           Pulmonary/Chest: No respiratory  distress.   Musculoskeletal:         General: Tenderness and edema present. Normal range of motion.      Right elbow: Normal.      Right forearm: Swelling, laceration and tenderness present. No edema, deformity or bony tenderness.      Right wrist: Swelling present. No deformity, effusion, lacerations, tenderness, bony tenderness or crepitus. Normal range of motion. Normal pulse.      Right hand: Normal.        Arms:      Neurological: She is alert and oriented to person, place, and time. She has normal strength. No sensory deficit.   Skin: Skin is warm and dry. No rash and no abscess noted. No erythema. No pallor.         ED Course   Procedures  Labs Reviewed - No data to display       Imaging Results              X-Ray Forearm Right (Final result)  Result time 05/19/25 00:09:46      Final result by Luis Bright MD (05/19/25 00:09:46)                   Impression:      No evidence of acute osseous process involving the right forearm.      Electronically signed by: Luis Bright  Date:    05/19/2025  Time:    00:09               Narrative:    EXAMINATION:  XR FOREARM RIGHT    CLINICAL HISTORY:  Bitten by dog, initial encounter    TECHNIQUE:  AP and lateral views of the right forearm were performed.    COMPARISON:  None available.    FINDINGS:  No evidence of acute fracture or dislocation involving the right forearm.  No evidence of subcutaneous radiopaque foreign body.  No evidence of soft tissue or osseous mass.                                       Medications   ibuprofen tablet 600 mg (has no administration in time range)   Tdap vaccine injection 0.5 mL (0.5 mLs Intramuscular Given 5/19/25 0051)   LIDOcaine (PF) 10 mg/ml (1%) injection 50 mg (50 mg Infiltration Given 5/19/25 0119)   neomycin-bacitracin-polymyxin ointment ( Topical (Top) Given 5/19/25 0119)   amoxicillin-clavulanate 875-125mg per tablet 1 tablet (1 tablet Oral Given 5/19/25 0115)     Medical Decision Making  Emergent eval of a  39-year-old female who presents to the ER due to 2 puncture wounds last lacerations to the right wrist and forearm sustained when she was trying to break up a dog fight between her 2 dogs.  She reports that 1 accidentally bit her.  Both dogs are up-to-date on vaccines.  She reports unknown last tetanus for herself  She reports no pain.  No bleeding at the time she reports she would feel numb around 1 wound but that has now normal.  Full range motion of the fingers and wrist without pain no numbness tingling or weakness.  No active bleeding    MDM    Patient presents for emergent evaluation of acute dog bite to right wrist and forearm by her own dog just prior to arrival when she is trying to break up a dog fight between her 2 dogs that poses a threat to life and/or bodily function.   Differential diagnosis includes but was not limited to dog bites, vascular neuro ligamentous or tendon injury foreign body fracture puncture wound versus laceration  In the ED patient found to have acute dog bite right wrist and forearm, 2 lacerations  I ordered X-rays and personally reviewed them and reviewed the radiologist interpretation.  Xray significant for   No evidence of acute osseous process involving the right forearm.       Discharge MDM     Patient was managed in the ED with tetanus was updated here.  Patient has been given an ice pack.  Reports she had not need Tylenol or ibuprofen at this time.  Will also be given her 1st dose of Augmentin discharged home with Augmentin discussed Tylenol and ibuprofen for pain ice and compression wounds he will be irrigated with sterile water and Betadine with syringe.  Neosporin applied and a pressure dressing placed  She reports both dogs were vaccinated for rabies.  The response to treatment was good.    Patient was discharged in stable condition.  Detailed return precautions discussed.  Patient was told to follow up with primary care physician or specialist based on their  diagnosis  Victoria Matsers MD      Risk  OTC drugs.  Prescription drug management.                                      Clinical Impression:  Final diagnoses:  [W54.0XXA] Dog bite  [S51.811A] Laceration of right forearm, initial encounter, two  (Primary)          ED Disposition Condition    Discharge Stable          ED Prescriptions       Medication Sig Dispense Start Date End Date Auth. Provider    amoxicillin-clavulanate 875-125mg (AUGMENTIN) 875-125 mg per tablet Take 1 tablet by mouth 2 (two) times daily. 14 tablet 5/19/2025 -- Victoria Masters MD          Follow-up Information       Follow up With Specialties Details Why Contact Info Additional Information    Maria Lepe MD Family Medicine Schedule an appointment as soon as possible for a visit in 2 days For wound re-check [address] [facility] [address]  [phone]       [facility] Emergency Medicine Go to  If symptoms worsen- redness increased swelling warmth drainage of pus fever equal to 100.4° or more [address] [facility] [id number] [phone] 1st floor                 [1]   Social History  Tobacco Use    Smoking status: Never    Smokeless tobacco: Never   Substance Use Topics    Alcohol use: Yes     Comment: rarely     Drug use: Never        Victoria Masters MD  05/19/25 0144

## 2025-08-08 ENCOUNTER — TELEPHONE (OUTPATIENT)
Dept: FAMILY MEDICINE | Facility: CLINIC | Age: 40
End: 2025-08-08
Payer: COMMERCIAL

## 2025-08-08 NOTE — TELEPHONE ENCOUNTER
Phone message left for patient, appointment on 9-22 will need to be rescheduled the provider is out of the office.

## 2025-08-15 ENCOUNTER — PATIENT MESSAGE (OUTPATIENT)
Dept: FAMILY MEDICINE | Facility: CLINIC | Age: 40
End: 2025-08-15
Payer: COMMERCIAL